# Patient Record
Sex: FEMALE | Race: WHITE | NOT HISPANIC OR LATINO | Employment: STUDENT | URBAN - METROPOLITAN AREA
[De-identification: names, ages, dates, MRNs, and addresses within clinical notes are randomized per-mention and may not be internally consistent; named-entity substitution may affect disease eponyms.]

---

## 2024-11-05 ENCOUNTER — OCCUPATIONAL HEALTH (OUTPATIENT)
Dept: URGENT CARE | Facility: CLINIC | Age: 29
End: 2024-11-05

## 2024-11-05 VITALS — BODY MASS INDEX: 31.65 KG/M2 | WEIGHT: 190 LBS | HEIGHT: 65 IN

## 2024-11-05 DIAGNOSIS — Z02.1 PRE-EMPLOYMENT EXAMINATION: Primary | ICD-10-CM

## 2024-12-07 ENCOUNTER — HOSPITAL ENCOUNTER (EMERGENCY)
Facility: HOSPITAL | Age: 29
Discharge: HOME | End: 2024-12-08
Attending: STUDENT IN AN ORGANIZED HEALTH CARE EDUCATION/TRAINING PROGRAM
Payer: MEDICAID

## 2024-12-07 VITALS
WEIGHT: 182 LBS | RESPIRATION RATE: 16 BRPM | HEIGHT: 65 IN | DIASTOLIC BLOOD PRESSURE: 88 MMHG | TEMPERATURE: 98 F | HEART RATE: 78 BPM | SYSTOLIC BLOOD PRESSURE: 127 MMHG | BODY MASS INDEX: 30.32 KG/M2 | OXYGEN SATURATION: 98 %

## 2024-12-07 DIAGNOSIS — R06.02 SOB (SHORTNESS OF BREATH): ICD-10-CM

## 2024-12-07 DIAGNOSIS — J40 BRONCHITIS: Primary | ICD-10-CM

## 2024-12-07 LAB
B-HCG UR QL: NEGATIVE
CTP QC/QA: YES

## 2024-12-07 PROCEDURE — 81025 URINE PREGNANCY TEST: CPT | Performed by: STUDENT IN AN ORGANIZED HEALTH CARE EDUCATION/TRAINING PROGRAM

## 2024-12-07 PROCEDURE — 93010 ELECTROCARDIOGRAM REPORT: CPT | Mod: ,,, | Performed by: INTERNAL MEDICINE

## 2024-12-07 PROCEDURE — 0241U SARS-COV2 (COVID) WITH FLU/RSV BY PCR: CPT | Performed by: STUDENT IN AN ORGANIZED HEALTH CARE EDUCATION/TRAINING PROGRAM

## 2024-12-07 PROCEDURE — 99284 EMERGENCY DEPT VISIT MOD MDM: CPT | Mod: 25

## 2024-12-07 PROCEDURE — 93005 ELECTROCARDIOGRAM TRACING: CPT

## 2024-12-08 LAB
INFLUENZA A, MOLECULAR: NOT DETECTED
INFLUENZA B, MOLECULAR: NOT DETECTED
OHS QRS DURATION: 74 MS
OHS QTC CALCULATION: 419 MS
RSV AG BY MOLECULAR METHOD: DETECTED
SARS-COV-2 RNA RESP QL NAA+PROBE: NOT DETECTED

## 2024-12-08 RX ORDER — PREDNISONE 20 MG/1
20 TABLET ORAL DAILY
Qty: 5 TABLET | Refills: 0 | Status: SHIPPED | OUTPATIENT
Start: 2024-12-08 | End: 2024-12-13

## 2024-12-08 NOTE — ED PROVIDER NOTES
Chief Complaint   Shortness of Breath (+SOB. +Chest tightness. +nasal congestion. Recently seen for same symptoms. Completed antibiotic treatment.)      History Of Present Illness   Katt South is a 29 y.o. female presenting with SOB.  Patient states that for the past week she has been having chest tightness, shortness of breath, nasal congestion, and cough productive of greenish sputum.  States that she has been trying her inhaler, saline nebulizers, and a Z-Tj without improvement of her symptoms.  States that she completed a Z-Tj few days ago.  She otherwise reports no lightheadedness, dizziness, loss of consciousness.  She reports no known fevers, or chills.  She reports no known sick contacts.    Independent Historian: Yes  Other Historian or Collateral: Chart review  Interpretor: No      Review of patient's allergies indicates:   Allergen Reactions    Imitrex [sumatriptan succinate] Other (See Comments)     Numbness in hands    Topamax [topiramate] Other (See Comments)     Numbness and tingling to fingers        No current facility-administered medications on file prior to encounter.     Current Outpatient Medications on File Prior to Encounter   Medication Sig Dispense Refill    busPIRone (BUSPAR) 30 MG Tab Take 30 mg by mouth 2 (two) times daily.      lamoTRIgine (LAMICTAL) 150 MG Tab Take 25 mg by mouth every evening.       lisdexamfetamine (VYVANSE) 60 MG capsule Take 60 mg by mouth every morning.      etonogestrel-ethinyl estradiol (NUVARING) 0.12-0.015 mg/24 hr vaginal ring Place 1 each vaginally every 28 days. (Patient not taking: Reported on 11/5/2024)      FLUoxetine (PROZAC) 40 MG capsule Take 20 mg by mouth every evening.  (Patient not taking: Reported on 11/5/2024)      gabapentin (NEURONTIN) 600 MG tablet Take 600 mg by mouth 3 (three) times daily. (Patient not taking: Reported on 11/5/2024)      methocarbamol (ROBAXIN) 750 MG Tab Take 500 mg by mouth 2 (two) times daily. (Patient not taking:  "Reported on 11/5/2024)      naproxen (NAPROSYN) 500 MG tablet Take 1 tablet (500 mg total) by mouth 3 (three) times daily as needed. (Patient not taking: Reported on 11/5/2024) 20 tablet 0    naproxen sodium (ANAPROX) 550 MG tablet Take 1 tablet (550 mg total) by mouth 3 (three) times daily with meals. Prn ovarian/pelvic pain (Patient not taking: Reported on 11/5/2024) 24 tablet 0    QUEtiapine (SEROQUEL) 25 MG Tab Take by mouth every evening. (Patient not taking: Reported on 11/5/2024)         Past History   As per HPI and below:  Past Medical History:   Diagnosis Date    Bipolar 1 disorder     Bipolar depression     Bronchitis     Pneumonia      History reviewed. No pertinent surgical history.    Social History     Socioeconomic History    Marital status:    Tobacco Use    Smoking status: Never    Smokeless tobacco: Never   Substance and Sexual Activity    Alcohol use: No    Drug use: No       No family history on file.    Physical Exam     Vitals:    12/07/24 2207 12/07/24 2346   BP: (!) 154/90 127/88   Pulse: (!) 117 78   Resp: 16    Temp: 97.7 °F (36.5 °C) 97.6 °F (36.4 °C)   TempSrc: Oral Oral   SpO2: 99% 98%   Weight: 82.6 kg (182 lb)    Height: 5' 5" (1.651 m)        Physical Exam  Vitals reviewed.   Constitutional:       General: She is not in acute distress.     Appearance: Normal appearance. She is not toxic-appearing.   HENT:      Head: Normocephalic and atraumatic.      Nose: Congestion present.      Mouth/Throat:      Mouth: Mucous membranes are moist.      Pharynx: No oropharyngeal exudate or posterior oropharyngeal erythema.   Eyes:      General: No scleral icterus.     Extraocular Movements: Extraocular movements intact.      Pupils: Pupils are equal, round, and reactive to light.   Cardiovascular:      Rate and Rhythm: Normal rate and regular rhythm.   Pulmonary:      Effort: No respiratory distress.      Breath sounds: No wheezing or rhonchi.   Chest:      Chest wall: No tenderness. "   Abdominal:      General: There is no distension.      Palpations: Abdomen is soft.      Tenderness: There is no abdominal tenderness. There is no guarding.   Musculoskeletal:         General: No deformity.      Cervical back: No rigidity.   Skin:     General: Skin is warm and dry.      Capillary Refill: Capillary refill takes less than 2 seconds.   Neurological:      General: No focal deficit present.      Mental Status: She is alert and oriented to person, place, and time.             Results     Labs Reviewed   HEPATITIS C ANTIBODY   HIV 1 / 2 ANTIBODY   SARS-COV2 (COVID) WITH FLU/RSV BY PCR   POCT URINE PREGNANCY       Result Value    POC Preg Test, Ur Negative       Acceptable Yes         Imaging Results              X-Ray Chest PA And Lateral (Final result)  Result time 12/07/24 23:50:06      Final result by Etta Leary MD (12/07/24 23:50:06)                   Impression:      No radiographic evidence of acute intra-thoracic process.      Electronically signed by: Etta Leary MD  Date:    12/07/2024  Time:    23:50               Narrative:    EXAMINATION:  XR CHEST PA AND LATERAL    CLINICAL HISTORY:  Shortness of breath    TECHNIQUE:  PA and lateral views of the chest were performed.    COMPARISON:  09/24/2019    FINDINGS:  The cardiomediastinal silhouette is within normal limits. Mediastinal structures are midline.  The lungs appear symmetrically aerated without definite focal alveolar consolidation. No large pleural effusion or pneumothorax is appreciated.Visualized osseous structures are intact.                                        Initial [facility]   Medical Decision Making  Patient was a 29-year-old female presenting with URI like symptoms.  Consider viral illness, bronchitis.  Very low suspicion for superimposed pneumonia but given that she was still coughing up green sputum after week and completed antibiotics, Will get chest x-ray for further evaluation.  Will get COVID/flu  testing.    Amount and/or Complexity of Data Reviewed  Labs:  Decision-making details documented in ED Course.  Radiology: ordered.    Risk  Prescription drug management.                  Medications Given / Interventions   Medications - No data to display    Procedures     ED POCUS Performed: No    Reassessment and ED Course     ED Course as of 12/08/24 0131   Sat Dec 07, 2024   2210 No STEMI on EKG [NN]   Sun Dec 08, 2024   0001 hCG Qualitative, Urine: Negative [CH]      ED Course User Index  [CH] Renu Luis MD  [NN] Kylie Bennett MD     Chest x-ray independently interpreted and without any evidence of focal consolidation.    Patient states that she needs to go home and can not wait for results of her COVID/flu/RSV swab.  She will be able to follow up the results on her [employer] janelle.    Discussed chest x-ray result with the patient.  Discussed treatment with steroids for the next 5 days.  Discussed need for outpatient follow up with PCP.  Given return precautions.  DC to home.         Final diagnoses:  [R06.02] SOB (shortness of breath)  [J40] Bronchitis (Primary)                 Dispo      ED Disposition Condition    Discharge Stable            ED Prescriptions       Medication Sig Dispense Start Date End Date Auth. Provider    predniSONE (DELTASONE) 20 MG tablet Take 1 tablet (20 mg total) by mouth once daily. for 5 days 5 tablet 12/8/2024 12/13/2024 Renu Luis MD          Follow-up Information       Follow up With Specialties Details Why Contact Info    Rodrigo Coughlin MD Family Medicine Schedule an appointment as soon as possible for a visit in 1 week  [address] [facility] [facility]  [address]  [place] [facility] [facility] [address]  [phone]                          Renu Luis MD  12/08/24 0131